# Patient Record
Sex: MALE | Race: WHITE | NOT HISPANIC OR LATINO | Employment: OTHER | URBAN - NONMETROPOLITAN AREA
[De-identification: names, ages, dates, MRNs, and addresses within clinical notes are randomized per-mention and may not be internally consistent; named-entity substitution may affect disease eponyms.]

---

## 2023-03-09 ENCOUNTER — OFFICE VISIT (OUTPATIENT)
Dept: URGENT CARE | Facility: CLINIC | Age: 73
End: 2023-03-09

## 2023-03-09 VITALS
HEART RATE: 77 BPM | OXYGEN SATURATION: 98 % | TEMPERATURE: 97.9 F | SYSTOLIC BLOOD PRESSURE: 122 MMHG | RESPIRATION RATE: 19 BRPM | DIASTOLIC BLOOD PRESSURE: 62 MMHG

## 2023-03-09 DIAGNOSIS — M62.830 BACK MUSCLE SPASM: Primary | ICD-10-CM

## 2023-03-09 DIAGNOSIS — S29.012A STRAIN OF THORACIC BACK REGION: ICD-10-CM

## 2023-03-09 DIAGNOSIS — M62.830 BACK MUSCLE SPASM: ICD-10-CM

## 2023-03-09 RX ORDER — LOSARTAN POTASSIUM AND HYDROCHLOROTHIAZIDE 12.5; 5 MG/1; MG/1
1 TABLET ORAL DAILY
COMMUNITY
Start: 2023-01-21

## 2023-03-09 RX ORDER — SIMVASTATIN 40 MG
40 TABLET ORAL EVERY EVENING
COMMUNITY
Start: 2023-01-21

## 2023-03-09 RX ORDER — METHOCARBAMOL 500 MG/1
500 TABLET, FILM COATED ORAL 4 TIMES DAILY
Qty: 20 TABLET | Refills: 0 | Status: SHIPPED | OUTPATIENT
Start: 2023-03-09

## 2023-03-09 RX ORDER — PANTOPRAZOLE SODIUM 40 MG/1
40 TABLET, DELAYED RELEASE ORAL DAILY
COMMUNITY
Start: 2023-02-21

## 2023-03-09 NOTE — PATIENT INSTRUCTIONS
Take prescribed medication as instructed  Avoid driving vehicle after taking  May want to take medication later in the evening when resting  Make sure to do passive stretching of the right shoulder  Avoid being sedentary for too long as this may worsen spasming/tenderness  Plenty of fluids  Continue with exercise routine as tolerated  Follow up with PCP in 3-5 days  Proceed to  ER if symptoms worsen  Muscle Spasm   WHAT YOU NEED TO KNOW:   A muscle spasm is a sudden contraction of any muscle or group of muscles  A muscle cramp is a painful muscle spasm  Muscle cramps commonly occur after intense exercise or during pregnancy  They may also be caused by certain medications, dehydration, low calcium or magnesium levels, or another medical condition  DISCHARGE INSTRUCTIONS:   Medicines: You may need the following:  NSAIDs  help decrease swelling and pain or fever  This medicine is available with or without a doctor's order  NSAIDs can cause stomach bleeding or kidney problems in certain people  If you take blood thinner medicine, always ask your healthcare provider if NSAIDs are safe for you  Always read the medicine label and follow directions  Take your medicine as directed  Contact your healthcare provider if you think your medicine is not helping or if you have side effects  Tell your provider if you are allergic to any medicine  Keep a list of the medicines, vitamins, and herbs you take  Include the amounts, and when and why you take them  Bring the list or the pill bottles to follow-up visits  Carry your medicine list with you in case of an emergency  Follow up with your healthcare provider as directed: You may need other tests or treatment  You may also be referred to a physical therapist or other specialist  Write down your questions so you remember to ask them during your visits  Self-care:   Stretch  your muscle to help relieve the cramp   It may be helpful to keep your muscle in the stretched position until the cramp is gone  Apply heat  to help decrease pain and muscle spasms  Apply heat on the area for 20 to 30 minutes every 2 hours for as many days as directed  Apply ice  to help decrease swelling and pain  Ice may also help prevent tissue damage  Use an ice pack, or put crushed ice in a plastic bag  Cover it with a towel and place it on your muscle for 15 to 20 minutes every hour or as directed  Drink more liquids  to help prevent muscle cramps caused by dehydration  Sports drinks may help replace electrolytes you lose through sweat during exercise  Ask your healthcare provider how much liquid to drink each day and which liquids are best for you  Eat healthy foods , such as fruits, vegetables, whole grains, low-fat dairy products, and lean proteins (meat, beans, and fish)  If you are pregnant, ask your healthcare provider about foods that are high in magnesium and sodium  They may help to relieve cramps during pregnancy  Massage your muscle  to help relieve the cramp  Take frequent deep breaths  until the cramp feels better  Lie down while you take the deep breaths so you do not get dizzy or lightheaded  Contact your healthcare provider if:   You have signs of dehydration, such as a headache, dark yellow urine, dry eyes or mouth, or a fast heartbeat  You have questions or concerns about your condition or care  Return to the emergency department if:   You have warmth, swelling, or redness in the cramping muscle  You have frequent or unrelieved muscle cramps in several different muscles  You have muscle cramps with numbness, tingling, and burning in your hands and feet  © Copyright [employer] 2022 Information is for End User's use only and may not be sold, redistributed or otherwise used for commercial purposes  The above information is an  only  It is not intended as medical advice for individual conditions or treatments   Talk to your doctor, nurse or pharmacist before following any medical regimen to see if it is safe and effective for you

## 2023-03-09 NOTE — PROGRESS NOTES
[facility]        NAME: Abraham Norton is a 67 y o  male  : 1950    MRN: [id number]  DATE: 2023  TIME: 1:22 PM    Assessment and Plan   Back muscle spasm [M62 830]  1  Back muscle spasm  methocarbamol (ROBAXIN) 500 mg tablet      2  Strain of thoracic back region              Patient Instructions     Take prescribed medication as instructed  Avoid driving vehicle after taking  May want to take medication later in the evening when resting  Make sure to do passive stretching of the right shoulder  Avoid being sedentary for too long as this may worsen spasming/tenderness  Plenty of fluids  Continue with exercise routine as tolerated  Follow up with PCP in 3-5 days  Proceed to  ER if symptoms worsen  Chief Complaint     Chief Complaint   Patient presents with   • Shoulder Pain     Right shoulder pain , and posterior upper back  Started last week, tried new equipment at gym  Heating pad used with minimal relief  OTC ibuprofen with minimal relief         History of Present Illness       68-year-old female presents with right mid back pain just inferior to the right scapula that began about a week ago  PT states that she recently joined a new gym and was doing weight lifting exercises  PT states that last week she tried the rowing machine, and this is when she started having pain the next day  PT denies any falls or injuries  Denies any previous injuries or surgeries to her right shoulder or back  PT has been taking ibuprofen with some relief as well as using a heating pad  Denies any fever, chills, chest pain, shortness of breath, abdominal pain, nausea, vomiting, diarrhea, numbness, tingling  Review of Systems   Review of Systems   Constitutional: Negative  HENT: Negative  Eyes: Negative  Respiratory: Negative  Cardiovascular: Negative  Gastrointestinal: Negative  Musculoskeletal: Positive for back pain  Skin: Negative  Neurological: Negative  Current Medications       Current Outpatient Medications:   •  losartan-hydrochlorothiazide (HYZAAR) 50-12 5 mg per tablet, Take 1 tablet by mouth daily, Disp: , Rfl:   •  methocarbamol (ROBAXIN) 500 mg tablet, Take 1 tablet (500 mg total) by mouth 4 (four) times a day, Disp: 20 tablet, Rfl: 0  •  pantoprazole (PROTONIX) 40 mg tablet, Take 40 mg by mouth daily, Disp: , Rfl:   •  simvastatin (ZOCOR) 40 mg tablet, Take 40 mg by mouth every evening, Disp: , Rfl:     Current Allergies     Allergies as of 03/09/2023 - Reviewed 03/09/2023   Allergen Reaction Noted   • Clarithromycin Hives and Rash 12/07/2018            The following portions of the patient's history were reviewed and updated as appropriate: allergies, current medications, past family history, past medical history, past social history, past surgical history and problem list      Past Medical History:   Diagnosis Date   • GERD (gastroesophageal reflux disease)    • Hyperlipidemia    • Hypertension        Past Surgical History:   Procedure Laterality Date   • CYST REMOVAL     • EYE SURGERY         No family history on file  Medications have been verified  Objective   /62   Pulse 77   Temp 97 9 °F (36 6 °C)   Resp 19   SpO2 98%        Physical Exam     Physical Exam  Constitutional:       General: He is not in acute distress  Appearance: Normal appearance  HENT:      Head: Normocephalic and atraumatic  Eyes:      Extraocular Movements: Extraocular movements intact  Conjunctiva/sclera: Conjunctivae normal       Pupils: Pupils are equal, round, and reactive to light  Cardiovascular:      Rate and Rhythm: Normal rate and regular rhythm  Pulses: Normal pulses  Heart sounds: Normal heart sounds  No murmur heard  No friction rub  No gallop  Pulmonary:      Effort: Pulmonary effort is normal  No respiratory distress  Breath sounds: Normal breath sounds  No stridor  No wheezing, rhonchi or rales  Chest:      Chest wall: No tenderness  Musculoskeletal:      Cervical back: Normal range of motion and neck supple  No tenderness  Thoracic back: Spasms and tenderness present  Normal range of motion  Back:    Skin:     General: Skin is warm and dry  Capillary Refill: Capillary refill takes less than 2 seconds  Findings: No rash  Neurological:      General: No focal deficit present  Mental Status: He is alert and oriented to person, place, and time     Psychiatric:         Mood and Affect: Mood normal          Behavior: Behavior normal

## 2023-06-07 RX ORDER — TIZANIDINE 2 MG/1
TABLET ORAL
Refills: 0 | OUTPATIENT
Start: 2023-06-07